# Patient Record
Sex: FEMALE | Race: WHITE
[De-identification: names, ages, dates, MRNs, and addresses within clinical notes are randomized per-mention and may not be internally consistent; named-entity substitution may affect disease eponyms.]

---

## 2022-04-01 ENCOUNTER — HOSPITAL ENCOUNTER (EMERGENCY)
Dept: HOSPITAL 63 - ER | Age: 52
Discharge: HOME | End: 2022-04-01
Payer: COMMERCIAL

## 2022-04-01 VITALS — WEIGHT: 137.35 LBS | BODY MASS INDEX: 22.88 KG/M2 | HEIGHT: 65 IN

## 2022-04-01 VITALS
DIASTOLIC BLOOD PRESSURE: 60 MMHG | SYSTOLIC BLOOD PRESSURE: 121 MMHG | SYSTOLIC BLOOD PRESSURE: 121 MMHG | DIASTOLIC BLOOD PRESSURE: 60 MMHG
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
## 2022-04-01 DIAGNOSIS — S62.346A: Primary | ICD-10-CM
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
## 2022-04-01 DIAGNOSIS — S00.83XA: ICD-10-CM
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
## 2022-04-01 PROCEDURE — 90715 TDAP VACCINE 7 YRS/> IM: CPT

## 2022-04-01 PROCEDURE — 29125 APPL SHORT ARM SPLINT STATIC: CPT

## 2022-04-01 PROCEDURE — 73130 X-RAY EXAM OF HAND: CPT

## 2022-04-01 PROCEDURE — 90471 IMMUNIZATION ADMIN: CPT

## 2022-04-01 PROCEDURE — 99283 EMERGENCY DEPT VISIT LOW MDM: CPT

## 2022-04-01 NOTE — RAD
XR HAND_RIGHT 3 VIEWS 4/1/2022 7:44 PM



INDICATION: Hand injury



COMPARISON: None available.



TECHNIQUE:  3 views of the right hand are provided.



FINDINGS/

IMPRESSION:

Focal cortical irregularity involving the base of the fifth metacarpal may represent a mildly displac
ed fracture. Correlate with point tenderness. This is only seen on a single view with indeterminate e
pidural involvement. Cross-sectional imaging could be of benefit. Joint spaces are maintained. Bone m
ineralization is within normal limits. Regional soft tissues are within normal limits. There is no so
ft tissue gas or osseous erosion. No radiopaque foreign body.



Electronically signed by: Julia Alonzo MD (4/1/2022 9:00 PM) [facility]
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
## 2022-05-05 ENCOUNTER — HOSPITAL ENCOUNTER (OUTPATIENT)
Dept: HOSPITAL 63 - RAD | Age: 52
End: 2022-05-05
Payer: COMMERCIAL
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
## 2022-05-05 DIAGNOSIS — S62.316D: Primary | ICD-10-CM

## 2022-05-05 PROCEDURE — 73130 X-RAY EXAM OF HAND: CPT

## 2022-05-05 NOTE — RAD
EXAM:  3 views right hand



DATE: 5/5/2022 8:40 AM



INDICATION: Reason: 5TH METACARPAL FRACTURE, 4 WEEKS AGO- FOLLOW UP / Spl. Instructions:  / History: 
.



COMPARISON: No Prior



FINDINGS/

IMPRESSION:

Progressively healing base of fifth metacarpal fracture in stable alignment.

No new fracture is seen.



Electronically signed by: Misha Paredes MD (5/5/2022 9:42 AM) [id number]